# Patient Record
Sex: FEMALE | Race: WHITE | NOT HISPANIC OR LATINO | Employment: FULL TIME | URBAN - METROPOLITAN AREA
[De-identification: names, ages, dates, MRNs, and addresses within clinical notes are randomized per-mention and may not be internally consistent; named-entity substitution may affect disease eponyms.]

---

## 2024-04-04 ENCOUNTER — HOSPITAL ENCOUNTER (EMERGENCY)
Facility: HOSPITAL | Age: 49
Discharge: HOME | End: 2024-04-04
Attending: EMERGENCY MEDICINE
Payer: COMMERCIAL

## 2024-04-04 ENCOUNTER — APPOINTMENT (OUTPATIENT)
Dept: RADIOLOGY | Facility: HOSPITAL | Age: 49
End: 2024-04-04
Payer: COMMERCIAL

## 2024-04-04 VITALS
BODY MASS INDEX: 32.14 KG/M2 | RESPIRATION RATE: 18 BRPM | OXYGEN SATURATION: 99 % | WEIGHT: 200 LBS | SYSTOLIC BLOOD PRESSURE: 121 MMHG | HEART RATE: 86 BPM | DIASTOLIC BLOOD PRESSURE: 59 MMHG | HEIGHT: 66 IN | TEMPERATURE: 97.8 F

## 2024-04-04 DIAGNOSIS — R53.1 GENERALIZED WEAKNESS: ICD-10-CM

## 2024-04-04 DIAGNOSIS — R00.2 PALPITATIONS: ICD-10-CM

## 2024-04-04 DIAGNOSIS — R42 LIGHT HEADEDNESS: ICD-10-CM

## 2024-04-04 DIAGNOSIS — E87.6 HYPOKALEMIA: Primary | ICD-10-CM

## 2024-04-04 LAB
ALBUMIN SERPL BCP-MCNC: 4 G/DL (ref 3.5–5)
ALP SERPL-CCNC: 63 U/L (ref 34–104)
ALT SERPL W P-5'-P-CCNC: 50 U/L (ref 7–52)
ANION GAP SERPL CALCULATED.3IONS-SCNC: 5 MMOL/L (ref 4–13)
AST SERPL W P-5'-P-CCNC: 35 U/L (ref 13–39)
BASOPHILS # BLD AUTO: 0.02 THOUSANDS/ÂΜL (ref 0–0.1)
BASOPHILS NFR BLD AUTO: 1 % (ref 0–1)
BILIRUB SERPL-MCNC: 0.3 MG/DL (ref 0.2–1)
BUN SERPL-MCNC: 9 MG/DL (ref 5–25)
CALCIUM SERPL-MCNC: 7.9 MG/DL (ref 8.4–10.2)
CARDIAC TROPONIN I PNL SERPL HS: <2 NG/L
CHLORIDE SERPL-SCNC: 109 MMOL/L (ref 96–108)
CO2 SERPL-SCNC: 23 MMOL/L (ref 21–32)
CREAT SERPL-MCNC: 0.5 MG/DL (ref 0.6–1.3)
EOSINOPHIL # BLD AUTO: 0.04 THOUSAND/ÂΜL (ref 0–0.61)
EOSINOPHIL NFR BLD AUTO: 1 % (ref 0–6)
ERYTHROCYTE [DISTWIDTH] IN BLOOD BY AUTOMATED COUNT: 15.4 % (ref 11.6–15.1)
GFR SERPL CREATININE-BSD FRML MDRD: 115 ML/MIN/1.73SQ M
GLUCOSE SERPL-MCNC: 111 MG/DL (ref 65–140)
HCT VFR BLD AUTO: 33.1 % (ref 34.8–46.1)
HGB BLD-MCNC: 9.8 G/DL (ref 11.5–15.4)
IMM GRANULOCYTES # BLD AUTO: 0.01 THOUSAND/UL (ref 0–0.2)
IMM GRANULOCYTES NFR BLD AUTO: 0 % (ref 0–2)
LYMPHOCYTES # BLD AUTO: 1.93 THOUSANDS/ÂΜL (ref 0.6–4.47)
LYMPHOCYTES NFR BLD AUTO: 44 % (ref 14–44)
MCH RBC QN AUTO: 26.7 PG (ref 26.8–34.3)
MCHC RBC AUTO-ENTMCNC: 29.6 G/DL (ref 31.4–37.4)
MCV RBC AUTO: 90 FL (ref 82–98)
MONOCYTES # BLD AUTO: 0.41 THOUSAND/ÂΜL (ref 0.17–1.22)
MONOCYTES NFR BLD AUTO: 10 % (ref 4–12)
NEUTROPHILS # BLD AUTO: 1.88 THOUSANDS/ÂΜL (ref 1.85–7.62)
NEUTS SEG NFR BLD AUTO: 44 % (ref 43–75)
NRBC BLD AUTO-RTO: 0 /100 WBCS
PLATELET # BLD AUTO: 216 THOUSANDS/UL (ref 149–390)
PMV BLD AUTO: 10.2 FL (ref 8.9–12.7)
POTASSIUM SERPL-SCNC: 3.2 MMOL/L (ref 3.5–5.3)
PROT SERPL-MCNC: 7.6 G/DL (ref 6.4–8.4)
RBC # BLD AUTO: 3.67 MILLION/UL (ref 3.81–5.12)
SODIUM SERPL-SCNC: 137 MMOL/L (ref 135–147)
WBC # BLD AUTO: 4.29 THOUSAND/UL (ref 4.31–10.16)

## 2024-04-04 PROCEDURE — 36415 COLL VENOUS BLD VENIPUNCTURE: CPT

## 2024-04-04 PROCEDURE — 93005 ELECTROCARDIOGRAM TRACING: CPT

## 2024-04-04 PROCEDURE — 71046 X-RAY EXAM CHEST 2 VIEWS: CPT

## 2024-04-04 PROCEDURE — 99285 EMERGENCY DEPT VISIT HI MDM: CPT | Performed by: EMERGENCY MEDICINE

## 2024-04-04 PROCEDURE — 99285 EMERGENCY DEPT VISIT HI MDM: CPT

## 2024-04-04 PROCEDURE — 84484 ASSAY OF TROPONIN QUANT: CPT | Performed by: EMERGENCY MEDICINE

## 2024-04-04 PROCEDURE — 85025 COMPLETE CBC W/AUTO DIFF WBC: CPT | Performed by: EMERGENCY MEDICINE

## 2024-04-04 PROCEDURE — 80053 COMPREHEN METABOLIC PANEL: CPT | Performed by: EMERGENCY MEDICINE

## 2024-04-04 RX ORDER — POTASSIUM CHLORIDE 20 MEQ/1
40 TABLET, EXTENDED RELEASE ORAL ONCE
Status: COMPLETED | OUTPATIENT
Start: 2024-04-04 | End: 2024-04-04

## 2024-04-04 RX ADMIN — POTASSIUM CHLORIDE 40 MEQ: 1500 TABLET, EXTENDED RELEASE ORAL at 15:19

## 2024-04-04 NOTE — ED PROVIDER NOTES
History  Chief Complaint   Patient presents with    Palpitations     Pt reports that she was driving to work when she suddenly got really shaky and hot and clammy. States that she now feels palpitations; hx of anemia, first iron infusion last week     Patient is a 49-year-old female with history of anemia that presents for evaluation of palpitations.  She says that she had an episode lasting about 1/2-hour prior to ED arrival.  She says that she had palpitations lightheadedness and felt shaky all over.  She also felt like she broke out in a sweat.  Symptoms had resolved by the time that I evaluated the patient.  No chest pain or dyspnea associated with the event.  No abdominal pain.  She had a similar episode about 4 weeks ago.  Patient has been eating and drinking normally otherwise.  She denies significant anxiety or stress currently.        None       Past Medical History:   Diagnosis Date    Osteoporosis     Primary ovarian failure     Sarcoidosis        Past Surgical History:   Procedure Laterality Date    BILIOPANCREATIC DIVISION W DUODENAL SWITCH         History reviewed. No pertinent family history.  I have reviewed and agree with the history as documented.    E-Cigarette/Vaping     E-Cigarette/Vaping Substances     Social History     Tobacco Use    Smoking status: Never    Smokeless tobacco: Never   Substance Use Topics    Alcohol use: Never    Drug use: Never       Review of Systems   Constitutional:  Negative for fever.   HENT:  Negative for sore throat.    Respiratory:  Negative for shortness of breath.    Cardiovascular:  Positive for palpitations. Negative for chest pain.   Gastrointestinal:  Negative for abdominal pain.   Genitourinary:  Negative for dysuria.   Musculoskeletal:  Negative for back pain.   Skin:  Negative for rash.   Neurological:  Positive for weakness and light-headedness.   Psychiatric/Behavioral:  Negative for agitation.    All other systems reviewed and are negative.      Physical  Exam  Physical Exam  Vitals reviewed.   Constitutional:       General: She is not in acute distress.     Appearance: She is well-developed.   HENT:      Head: Normocephalic.   Eyes:      Pupils: Pupils are equal, round, and reactive to light.   Cardiovascular:      Rate and Rhythm: Normal rate and regular rhythm.      Heart sounds: Normal heart sounds.   Pulmonary:      Effort: Pulmonary effort is normal.      Breath sounds: Normal breath sounds.   Abdominal:      General: Bowel sounds are normal. There is no distension.      Palpations: Abdomen is soft.      Tenderness: There is no abdominal tenderness. There is no guarding.   Musculoskeletal:         General: No tenderness or deformity. Normal range of motion.      Cervical back: Normal range of motion and neck supple.   Skin:     General: Skin is warm and dry.      Capillary Refill: Capillary refill takes less than 2 seconds.   Neurological:      Mental Status: She is alert and oriented to person, place, and time.      Cranial Nerves: No cranial nerve deficit.      Sensory: No sensory deficit.   Psychiatric:         Behavior: Behavior normal.         Thought Content: Thought content normal.         Judgment: Judgment normal.         Vital Signs  ED Triage Vitals   Temperature Pulse Respirations Blood Pressure SpO2   04/04/24 1309 04/04/24 1309 04/04/24 1309 04/04/24 1311 04/04/24 1309   97.8 °F (36.6 °C) 86 18 121/59 99 %      Temp Source Heart Rate Source Patient Position - Orthostatic VS BP Location FiO2 (%)   04/04/24 1309 04/04/24 1309 -- -- --   Temporal Monitor         Pain Score       --                  Vitals:    04/04/24 1309 04/04/24 1311   BP:  121/59   Pulse: 86          Visual Acuity      ED Medications  Medications   potassium chloride (Klor-Con M20) CR tablet 40 mEq (40 mEq Oral Given 4/4/24 1519)       Diagnostic Studies  Results Reviewed       Procedure Component Value Units Date/Time    HS Troponin I 4hr [[id number]]     Lab Status: No result  Specimen: Blood     HS Troponin 0hr (reflex protocol) [[id number]]  (Normal) Collected: 04/04/24 1312    Lab Status: Final result Specimen: Blood from Arm, Right Updated: 04/04/24 1342     hs TnI 0hr <2 ng/L     HS Troponin I 2hr [[id number]]     Lab Status: No result Specimen: Blood     Comprehensive metabolic panel [[id number]]  (Abnormal) Collected: 04/04/24 1312    Lab Status: Final result Specimen: Blood from Arm, Right Updated: 04/04/24 1334     Sodium 137 mmol/L      Potassium 3.2 mmol/L      Chloride 109 mmol/L      CO2 23 mmol/L      ANION GAP 5 mmol/L      BUN 9 mg/dL      Creatinine 0.50 mg/dL      Glucose 111 mg/dL      Calcium 7.9 mg/dL      AST 35 U/L      ALT 50 U/L      Alkaline Phosphatase 63 U/L      Total Protein 7.6 g/dL      Albumin 4.0 g/dL      Total Bilirubin 0.30 mg/dL      eGFR 115 ml/min/1.73sq m     Narrative:      National Kidney Disease Foundation guidelines for Chronic Kidney Disease (CKD):     Stage 1 with normal or high GFR (GFR > 90 mL/min/1.73 square meters)    Stage 2 Mild CKD (GFR = 60-89 mL/min/1.73 square meters)    Stage 3A Moderate CKD (GFR = 45-59 mL/min/1.73 square meters)    Stage 3B Moderate CKD (GFR = 30-44 mL/min/1.73 square meters)    Stage 4 Severe CKD (GFR = 15-29 mL/min/1.73 square meters)    Stage 5 End Stage CKD (GFR <15 mL/min/1.73 square meters)  Note: GFR calculation is accurate only with a steady state creatinine    CBC and differential [[id number]]  (Abnormal) Collected: 04/04/24 1312    Lab Status: Final result Specimen: Blood from Arm, Right Updated: 04/04/24 1317     WBC 4.29 Thousand/uL      RBC 3.67 Million/uL      Hemoglobin 9.8 g/dL      Hematocrit 33.1 %      MCV 90 fL      MCH 26.7 pg      MCHC 29.6 g/dL      RDW 15.4 %      MPV 10.2 fL      Platelets 216 Thousands/uL      nRBC 0 /100 WBCs      Neutrophils Relative 44 %      Immature Grans % 0 %      Lymphocytes Relative 44 %      Monocytes Relative 10 %      Eosinophils Relative 1 %      Basophils  Relative 1 %      Neutrophils Absolute 1.88 Thousands/µL      Absolute Immature Grans 0.01 Thousand/uL      Absolute Lymphocytes 1.93 Thousands/µL      Absolute Monocytes 0.41 Thousand/µL      Eosinophils Absolute 0.04 Thousand/µL      Basophils Absolute 0.02 Thousands/µL                    XR chest 2 views   ED Interpretation by Chalo Velez MD (04/04 1450)   ED wet read: No acute cardiopulmonary process noted      Final Result by Jamey Butler MD (04/04 1530)      No acute cardiopulmonary disease.            Workstation performed: [id number]                    Procedures  ECG 12 Lead Documentation Only    Date/Time: 4/4/2024 3:41 PM    Performed by: Chalo Velez MD  Authorized by: Chalo Velez MD    ECG reviewed by me, the ED Provider: yes    Patient location:  ED  Previous ECG:     Previous ECG:  Unavailable    Comparison to cardiac monitor: Yes    Interpretation:     Interpretation: normal    Rate:     ECG rate assessment: normal    Rhythm:     Rhythm: sinus rhythm    Ectopy:     Ectopy: none    QRS:     QRS axis:  Normal    QRS intervals:  Normal  Conduction:     Conduction: normal    ST segments:     ST segments:  Normal  T waves:     T waves: normal             ED Course                               SBIRT 22yo+      Flowsheet Row Most Recent Value   Initial Alcohol Screen: US AUDIT-C     1. How often do you have a drink containing alcohol? 0 Filed at: 04/04/2024 1311   2. How many drinks containing alcohol do you have on a typical day you are drinking?  0 Filed at: 04/04/2024 1311   3a. Male UNDER 65: How often do you have five or more drinks on one occasion? 0 Filed at: 04/04/2024 1311   3b. FEMALE Any Age, or MALE 65+: How often do you have 4 or more drinks on one occassion? 0 Filed at: 04/04/2024 1311   Audit-C Score 0 Filed at: 04/04/2024 1311   BRIE: How many times in the past year have you...    Used an illegal drug or used a prescription medication for non-medical reasons? Never  Filed at: 04/04/2024 1311                      Medical Decision Making  Patient is a 49-year-old female presents for evaluation of an episode of palpitations, lightheadedness, generalized weakness and shakiness.  Blood work reviewed and shows anemia, patient notes to me that her hemoglobin was 9.1 most recently, she received an iron transfusion last week.  Otherwise workup does reveal some hypokalemia.  Chest x-ray and EKG reviewed and unremarkable.  Some concern for possible arrhythmia, Holter monitor prescribed.  Also possible that this is a panic attack, did discuss this with the patient but she doubts that it is related to anxiety.    Amount and/or Complexity of Data Reviewed  Labs: ordered.  Radiology: ordered and independent interpretation performed.  ECG/medicine tests: ordered and independent interpretation performed.    Risk  Prescription drug management.             Disposition  Final diagnoses:   Hypokalemia   Palpitations   Light headedness   Generalized weakness     Time reflects when diagnosis was documented in both MDM as applicable and the Disposition within this note       Time User Action Codes Description Comment    4/4/2024  3:00 PM Del Velezt Add [E87.6] Hypokalemia     4/4/2024  3:00 PM Aviva Velezrett Add [R00.2] Palpitations     4/4/2024  3:00 PM ShAviva reinarett Add [R42] Light headedness     4/4/2024  3:00 PM Aviva Velezrett Add [R53.1] Generalized weakness           ED Disposition       ED Disposition   Discharge    Condition   Stable    Date/Time   Thu Apr 4, 2024 1500    Comment   Adilene Hughes discharge to home/self care.                   Follow-up Information       Follow up With Specialties Details Why Contact Info Additional Information     [facility] Emergency Department Emergency Medicine  If symptoms worsen [address] [place] [facility] [address]  [phone] [facility] Emergency Department, [address] [facility]  [place], [place] [address]    [facility] Cardiology Schedule an appointment as soon as possible for a visit   [address]  [facility] [address] [phone] [facility], [address], [place], [place], [address] [phone]            There are no discharge medications for this patient.      Outpatient Discharge Orders   Holter monitor   Standing Status: Future Standing Exp. Date: 04/04/28       PDMP Review       None            ED Provider  Electronically Signed by             Chalo Velez MD  04/04/24 0271

## 2024-04-04 NOTE — Clinical Note
Adilene Hughes was seen and treated in our emergency department on 4/4/2024.                Diagnosis:     Adilene  may return to work on return date.    She may return on this date: 04/05/2024         If you have any questions or concerns, please don't hesitate to call.      Chalo Velez MD    ______________________________           _______________          _______________  Hospital Representative                              Date                                Time

## 2024-04-04 NOTE — DISCHARGE INSTRUCTIONS
-Please follow-up with family doctor for low potassium. Foods that are high in potassium include bananas, oranges, tomatoes, potatoes, and avocado. Dee beans, turkey, salmon, lean beef, yogurt, and milk are also high in potassium.    -Please get a Holter monitor done over the next couple weeks and follow with cardiology as discussed

## 2024-04-05 LAB
ATRIAL RATE: 78 BPM
P AXIS: 51 DEGREES
PR INTERVAL: 180 MS
QRS AXIS: 30 DEGREES
QRSD INTERVAL: 106 MS
QT INTERVAL: 412 MS
QTC INTERVAL: 469 MS
T WAVE AXIS: 35 DEGREES
VENTRICULAR RATE: 78 BPM

## 2024-04-05 PROCEDURE — 93010 ELECTROCARDIOGRAM REPORT: CPT | Performed by: INTERNAL MEDICINE

## 2024-04-12 ENCOUNTER — HOSPITAL ENCOUNTER (OUTPATIENT)
Dept: NON INVASIVE DIAGNOSTICS | Age: 49
Discharge: HOME | End: 2024-04-12
Payer: COMMERCIAL

## 2024-04-12 DIAGNOSIS — R00.2 PALPITATIONS: ICD-10-CM

## 2024-04-12 DIAGNOSIS — R53.1 GENERALIZED WEAKNESS: ICD-10-CM

## 2024-04-12 DIAGNOSIS — R42 LIGHT HEADEDNESS: ICD-10-CM

## 2024-04-12 PROCEDURE — 93225 XTRNL ECG REC<48 HRS REC: CPT

## 2024-04-12 PROCEDURE — 93226 XTRNL ECG REC<48 HR SCAN A/R: CPT

## 2024-09-27 ENCOUNTER — HOSPITAL ENCOUNTER (OUTPATIENT)
Dept: HOSPITAL 99 - WDC | Age: 49
End: 2024-09-27
Payer: COMMERCIAL

## 2024-09-27 DIAGNOSIS — Z12.31: Primary | ICD-10-CM
